# Patient Record
Sex: FEMALE | Race: BLACK OR AFRICAN AMERICAN
[De-identification: names, ages, dates, MRNs, and addresses within clinical notes are randomized per-mention and may not be internally consistent; named-entity substitution may affect disease eponyms.]

---

## 2017-05-03 ENCOUNTER — HOSPITAL ENCOUNTER (EMERGENCY)
Age: 22
Discharge: HOME | End: 2017-05-03
Payer: COMMERCIAL
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
## 2017-05-03 DIAGNOSIS — Z79.4: ICD-10-CM
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
## 2017-05-03 DIAGNOSIS — T23.251A: Primary | ICD-10-CM

## 2017-05-03 DIAGNOSIS — I10: ICD-10-CM

## 2017-05-03 DIAGNOSIS — E11.9: ICD-10-CM

## 2017-05-03 PROCEDURE — 1040M: CPT

## 2017-05-03 PROCEDURE — 99283 EMERGENCY DEPT VISIT LOW MDM: CPT

## 2017-05-03 PROCEDURE — 99282 EMERGENCY DEPT VISIT SF MDM: CPT

## 2017-06-02 ENCOUNTER — HOSPITAL ENCOUNTER (OUTPATIENT)
Dept: HOSPITAL 76 - EMS | Age: 22
Discharge: ANOTHER HOSPITAL | End: 2017-06-02
Attending: SURGERY
Payer: MEDICAID

## 2017-06-02 DIAGNOSIS — R73.09: Primary | ICD-10-CM

## 2017-06-02 DIAGNOSIS — R11.2: ICD-10-CM

## 2019-09-27 ENCOUNTER — HOSPITAL ENCOUNTER (OUTPATIENT)
Dept: HOSPITAL 76 - EMS | Age: 24
End: 2019-09-27
Attending: SURGERY
Payer: MEDICAID

## 2019-09-27 DIAGNOSIS — R73.09: ICD-10-CM

## 2019-09-27 DIAGNOSIS — R56.9: Primary | ICD-10-CM

## 2019-11-03 ENCOUNTER — HOSPITAL ENCOUNTER (EMERGENCY)
Dept: HOSPITAL 76 - ED | Age: 24
Discharge: HOME | End: 2019-11-03
Payer: MEDICAID

## 2019-11-03 ENCOUNTER — HOSPITAL ENCOUNTER (OUTPATIENT)
Dept: HOSPITAL 76 - EMS | Age: 24
Discharge: ANOTHER HOSPITAL | End: 2019-11-03
Attending: SURGERY
Payer: MEDICAID

## 2019-11-03 VITALS — DIASTOLIC BLOOD PRESSURE: 99 MMHG | SYSTOLIC BLOOD PRESSURE: 147 MMHG

## 2019-11-03 DIAGNOSIS — R11.2: Primary | ICD-10-CM

## 2019-11-03 DIAGNOSIS — R53.1: ICD-10-CM

## 2019-11-03 DIAGNOSIS — E86.0: Primary | ICD-10-CM

## 2019-11-03 DIAGNOSIS — I10: ICD-10-CM

## 2019-11-03 DIAGNOSIS — K31.84: ICD-10-CM

## 2019-11-03 DIAGNOSIS — E10.43: ICD-10-CM

## 2019-11-03 DIAGNOSIS — E10.65: ICD-10-CM

## 2019-11-03 LAB
ALBUMIN DIAFP-MCNC: 3.9 G/DL (ref 3.2–5.5)
ALBUMIN/GLOB SERPL: 1.1 {RATIO} (ref 1–2.2)
ALP SERPL-CCNC: 36 IU/L (ref 42–121)
ALT SERPL W P-5'-P-CCNC: 10 IU/L (ref 10–60)
ANION GAP SERPL CALCULATED.4IONS-SCNC: 13 MMOL/L (ref 6–13)
AST SERPL W P-5'-P-CCNC: 15 IU/L (ref 10–42)
BASE EXCESS BLDV CALC-SCNC: -0.2 MMOL/L
BASOPHILS NFR BLD AUTO: 0 10^3/UL (ref 0–0.1)
BASOPHILS NFR BLD AUTO: 0.2 %
BILIRUB BLD-MCNC: 1.5 MG/DL (ref 0.2–1)
BILIRUB UR QL CFM: NEGATIVE
BUN SERPL-MCNC: 8 MG/DL (ref 6–20)
CALCIUM UR-MCNC: 8.9 MG/DL (ref 8.5–10.3)
CHLORIDE SERPL-SCNC: 108 MMOL/L (ref 101–111)
CLARITY UR REFRACT.AUTO: CLEAR
CO2 BLDV-SCNC: 25.6 MMOL/L (ref 24–29)
CO2 SERPL-SCNC: 23 MMOL/L (ref 21–32)
CREAT SERPLBLD-SCNC: 0.8 MG/DL (ref 0.4–1)
EOSINOPHIL # BLD AUTO: 0 10^3/UL (ref 0–0.7)
EOSINOPHIL NFR BLD AUTO: 0 %
ERYTHROCYTE [DISTWIDTH] IN BLOOD BY AUTOMATED COUNT: 14.2 % (ref 12–15)
GFRSERPLBLD MDRD-ARVRAT: 109 ML/MIN/{1.73_M2} (ref 89–?)
GLOBULIN SER-MCNC: 3.5 G/DL (ref 2.1–4.2)
GLUCOSE SERPL-MCNC: 135 MG/DL (ref 70–100)
GLUCOSE UR QL STRIP.AUTO: NEGATIVE MG/DL
HCG UR QL: NEGATIVE
HGB UR QL STRIP: 11.1 G/DL (ref 12–16)
KETONES SERPL STRIP-SCNC: NEGATIVE MMOL/L
KETONES UR QL STRIP.AUTO: >=80 MG/DL
LIPASE SERPL-CCNC: 21 U/L (ref 22–51)
LYMPHOCYTES # SPEC AUTO: 0.7 10^3/UL (ref 1.5–3.5)
LYMPHOCYTES NFR BLD AUTO: 7.5 %
MCH RBC QN AUTO: 24.7 PG (ref 27–31)
MCHC RBC AUTO-ENTMCNC: 32.2 G/DL (ref 32–36)
MCV RBC AUTO: 76.7 FL (ref 81–99)
MONOCYTES # BLD AUTO: 0.2 10^3/UL (ref 0–1)
MONOCYTES NFR BLD AUTO: 1.8 %
MUCOUS THREADS URNS QL MICRO: (no result)
NEUTROPHILS # BLD AUTO: 8.1 10^3/UL (ref 1.5–6.6)
NEUTROPHILS # SNV AUTO: 9 X10^3/UL (ref 4.8–10.8)
NEUTROPHILS NFR BLD AUTO: 90.1 %
NITRITE UR QL STRIP.AUTO: NEGATIVE
PCO2 BLDV: 39.5 MMHG (ref 41–51)
PDW BLD AUTO: 10.9 FL (ref 7.9–10.8)
PH BLDV: 7.41 [PH] (ref 7.31–7.41)
PH UR STRIP.AUTO: 6 PH (ref 5–7.5)
PLATELET # BLD: 265 10^3/UL (ref 130–450)
PO2 BLDV: 34.8 MMHG (ref 25–47)
PROT SPEC-MCNC: 7.4 G/DL (ref 6.7–8.2)
PROT UR STRIP.AUTO-MCNC: 30 MG/DL
RBC # UR STRIP.AUTO: NEGATIVE /UL
RBC # URNS HPF: (no result) /HPF (ref 0–5)
RBC MAR: 4.5 10^6/UL (ref 4.2–5.4)
SODIUM SERPLBLD-SCNC: 144 MMOL/L (ref 135–145)
SP GR UR STRIP.AUTO: 1.02 (ref 1–1.03)
SQUAMOUS URNS QL MICRO: (no result)
UROBILINOGEN UR QL STRIP.AUTO: (no result) E.U./DL
UROBILINOGEN UR STRIP.AUTO-MCNC: NEGATIVE MG/DL

## 2019-11-03 PROCEDURE — 36415 COLL VENOUS BLD VENIPUNCTURE: CPT

## 2019-11-03 PROCEDURE — 82803 BLOOD GASES ANY COMBINATION: CPT

## 2019-11-03 PROCEDURE — 82009 KETONE BODYS QUAL: CPT

## 2019-11-03 PROCEDURE — 83605 ASSAY OF LACTIC ACID: CPT

## 2019-11-03 PROCEDURE — 96375 TX/PRO/DX INJ NEW DRUG ADDON: CPT

## 2019-11-03 PROCEDURE — 96374 THER/PROPH/DIAG INJ IV PUSH: CPT

## 2019-11-03 PROCEDURE — 99284 EMERGENCY DEPT VISIT MOD MDM: CPT

## 2019-11-03 PROCEDURE — 83690 ASSAY OF LIPASE: CPT

## 2019-11-03 PROCEDURE — 87086 URINE CULTURE/COLONY COUNT: CPT

## 2019-11-03 PROCEDURE — 81025 URINE PREGNANCY TEST: CPT

## 2019-11-03 PROCEDURE — 81003 URINALYSIS AUTO W/O SCOPE: CPT

## 2019-11-03 PROCEDURE — 81001 URINALYSIS AUTO W/SCOPE: CPT

## 2019-11-03 PROCEDURE — 80053 COMPREHEN METABOLIC PANEL: CPT

## 2019-11-03 PROCEDURE — 85025 COMPLETE CBC W/AUTO DIFF WBC: CPT

## 2019-11-03 NOTE — ED PHYSICIAN DOCUMENTATION
PD HPI NVD





- Stated complaint


Stated Complaint: N/V





- Chief complaint


Chief Complaint: Abd Pain





- History obtained from


History obtained from: Patient





- History of Present Illness


Timing - onset: How many days ago (3)


Timing - duration: Days (3)


Timing - details: Gradual onset, Still present


Associated symptoms: Abdominal pain, Dizzy, Near syncope / syncope, Loss of 

appetite


Contributing factors: Diabetes


Improved by: Vomiting


Similar symptoms before: Diagnosis (dehydration)


Recently seen: Emergency Dept





- Additonal information


Additional information: 





26-year-old female with a history of type 1 diabetes has developed nausea and 

vomiting over the past 3 days.  She has developed abdominal pain associated with

this and she continues to have issues with vomiting.  She was seen in the 

emergency department at [facility] this morning at 5 AM.  She states that 

there she received several 100 mL's of fluid and Zofran did not seem to help 

with the vomiting Reglan did seem to help and she was sent home with a 

prescription for some Reglan.  She does not feel that her diabetes is out of 

control.





Review of Systems


Constitutional: reports: Chills.  denies: Fever


Eyes: denies: Decreased vision


Ears: denies: Ear pain


Nose: denies: Rhinorrhea / runny nose, Congestion


Throat: denies: Sore throat


Cardiac: denies: Chest pain / pressure, Palpitations


Respiratory: denies: Dyspnea, Cough


GI: reports: Abdominal Pain, Nausea, Vomiting


: denies: Dysuria, Frequency





PD PAST MEDICAL HISTORY





- Past Medical History


Cardiovascular: Hypertension


Endocrine/Autoimmune: Type 1 diabetes





- Past Surgical History


Past Surgical History: No





- Present Medications


Home Medications: 


                                Ambulatory Orders











 Medication  Instructions  Recorded  Confirmed


 


Ferrous Sulfate [Iron] 1 tab ORAL DAILY 12/17/13 05/03/17


 


Lisinopril [Prinivil] 0 mg ORAL DAILY 12/17/13 05/03/17


 


Chlorthalidone 0 mg ORAL DAILY 05/03/17 05/03/17


 


Insulin Glargine [Lantus] 32 units SQ DAILY 05/03/17 05/03/17


 


Insulin Lispro [Humalog] 0 units SQ TIDWM 05/03/17 05/03/17














- Allergies


Allergies/Adverse Reactions: 


                                    Allergies











Allergy/AdvReac Type Severity Reaction Status Date / Time


 


hydrocodone bitartrate * Allergy  Itching Verified 05/03/17 10:13





[From Vicodin]     


 


ibuprofen AdvReac  Unknown Verified 05/03/17 10:39














- Social History


Does the pt smoke?: No


Smoking Status: Never smoker


Does the pt drink ETOH?: No


Does the pt have substance abuse?: No





- Immunizations


Immunizations are current?: Yes





- POLST


Patient has POLST: No





PD ED PE NORMAL





- Vitals


Vital signs reviewed: Yes (tachy and hypertensive)





- General


General: Alert and oriented X 3, No acute distress, Well developed/nourished





- HEENT


HEENT: Atraumatic, PERRL, EOMI





- Neck


Neck: Supple, no meningeal sign, No bony TTP





- Cardiac


Cardiac: No murmur, Other (tachy to 110)





- Respiratory


Respiratory: No respiratory distress, Clear bilaterally





- Abdomen


Abdomen: Soft, Other (mild epigastric tenderness)





- Back


Back: No CVA TTP, No spinal TTP





- Derm


Derm: Normal color, Warm and dry, No rash





- Extremities


Extremities: No deformity, No edema





- Neuro


Neuro: Alert and oriented X 3, CNs 2-12 intact, No motor deficit, No sensory 

deficit, Normal speech


Eye Opening: Spontaneous


Motor: Obeys Commands


Verbal: Oriented


GCS Score: 15





- Psych


Psych: Normal mood, Normal affect





Results





- Vitals


Vitals: 


                               Vital Signs - 24 hr











  11/03/19 11/03/19





  10:29 12:07


 


Temperature 37.4 C 


 


Heart Rate 119 H 88


 


Respiratory 18 18





Rate  


 


Blood Pressure 173/112 H 147/99 H


 


O2 Saturation 97 98








                                     Oxygen











O2 Source                      Room air

















- Labs


Labs: 


                                Laboratory Tests











  11/03/19 11/03/19 11/03/19





  11:21 11:21 11:21


 


WBC  9.0  


 


RBC  4.50  


 


Hgb  11.1 L  


 


Hct  34.5 L  


 


MCV  76.7 L  


 


MCH  24.7 L  


 


MCHC  32.2  


 


RDW  14.2  


 


Plt Count  265  


 


MPV  10.9 H  


 


Neut # (Auto)  8.1 H  


 


Lymph # (Auto)  0.7 L  


 


Mono # (Auto)  0.2  


 


Eos # (Auto)  0.0  


 


Baso # (Auto)  0.0  


 


Absolute Nucleated RBC  0.00  


 


Nucleated RBC %  0.0  


 


VBG pH   


 


VBG pCO2   


 


VBG pO2   


 


VBG HCO3   


 


VBG Total CO2   


 


VBG O2 Saturation   


 


VBG Base Excess   


 


Sodium   144 


 


Potassium   3.0 L 


 


Chloride   108 


 


Carbon Dioxide   23 


 


Anion Gap   13.0 


 


BUN   8 


 


Creatinine   0.8 


 


Estimated GFR (MDRD)   109 


 


Glucose   135 H 


 


Lactic Acid    1.7


 


Calcium   8.9 


 


Total Bilirubin   1.5 H 


 


AST   15 


 


ALT   10 


 


Alkaline Phosphatase   36 L 


 


Total Protein   7.4 


 


Albumin   3.9 


 


Globulin   3.5 


 


Albumin/Globulin Ratio   1.1 


 


Lipase   21 L 


 


Urine Color   


 


Urine Clarity   


 


Urine pH   


 


Ur Specific Gravity   


 


Urine Protein   


 


Urine Glucose (UA)   


 


Urine Ketones   


 


Urine Occult Blood   


 


Urine Nitrite   


 


Urine Bilirubin   


 


Urine Urobilinogen   


 


Ur Leukocyte Esterase   


 


Ur Microscopic Review   


 


Urine Culture Comments   


 


Urine HCG, Qual   


 


Serum Ketones   NEGATIVE 














  11/03/19 11/03/19





  11:21 12:15


 


WBC  


 


RBC  


 


Hgb  


 


Hct  


 


MCV  


 


MCH  


 


MCHC  


 


RDW  


 


Plt Count  


 


MPV  


 


Neut # (Auto)  


 


Lymph # (Auto)  


 


Mono # (Auto)  


 


Eos # (Auto)  


 


Baso # (Auto)  


 


Absolute Nucleated RBC  


 


Nucleated RBC %  


 


VBG pH  7.408 


 


VBG pCO2  39.5 L 


 


VBG pO2  34.8 


 


VBG HCO3  24.4 


 


VBG Total CO2  25.6 


 


VBG O2 Saturation  70.4 


 


VBG Base Excess  -0.2 


 


Sodium  


 


Potassium  


 


Chloride  


 


Carbon Dioxide  


 


Anion Gap  


 


BUN  


 


Creatinine  


 


Estimated GFR (MDRD)  


 


Glucose  


 


Lactic Acid  


 


Calcium  


 


Total Bilirubin  


 


AST  


 


ALT  


 


Alkaline Phosphatase  


 


Total Protein  


 


Albumin  


 


Globulin  


 


Albumin/Globulin Ratio  


 


Lipase  


 


Urine Color   YELLOW


 


Urine Clarity   CLEAR


 


Urine pH   6.0


 


Ur Specific Gravity   1.020


 


Urine Protein   30 H


 


Urine Glucose (UA)   NEGATIVE


 


Urine Ketones   >=80 H


 


Urine Occult Blood   NEGATIVE


 


Urine Nitrite   NEGATIVE


 


Urine Bilirubin   NEGATIVE


 


Urine Urobilinogen   0.2 (NORMAL)


 


Ur Leukocyte Esterase   NEGATIVE


 


Ur Microscopic Review   INDICATED


 


Urine Culture Comments   Not Reportable


 


Urine HCG, Qual   NEGATIVE


 


Serum Ketones  














Procedures





- IVC sono (time)


  ** 1040


Bedside IVC sono: IVC measures (cm) (0.87), IVC collapsed c insp (cm) 

(complete), Dehydration (est 2 liter deficit)





PD MEDICAL DECISION MAKING





- ED course


Complexity details: reviewed old records, reviewed results, re-evaluated 

patient, considered differential, d/w patient


ED course: 





24-year-old type I diabetic with nausea and vomiting is dehydrated on 

interrogation the inferior vena cava and she is administered intravenous saline 

and another dose of Reglan.  She does have some improvement with this she has 

some abdominal pain and received some Dilaudid as well.  She feels much improved

at the conclusion of treatment.  She does not have ketoacidosis.  I did discuss 

with the patient use of cannabis and the possibility of cannabis hyperemesis and

the patient indicates that she has decreased the amount of cannabis she has been

using over the past several weeks and she has not used in the past 2 days.  She 

does state that she was on a new medication prescribed for depression and she 

was on day 3 of this when her vomiting started and she has not been on this 

since and has had 2 more days of vomiting.  She does not think the medication 

had anything to do with this.





The patient was prescribed Reglan and Zofran today at [facility] and I have

encouraged her to use those prescriptions for her treatment.





Departure





- Departure


Disposition: 01 Home, Self Care


Clinical Impression: 


 Dehydration, Gastroparesis diabeticorum





Diabetes mellitus with hyperglycemia


Qualifiers:


 Diabetes mellitus type: type 1 Qualified Code(s): E10.65 - Type 1 diabetes 

mellitus with hyperglycemia





Condition: Stable


Instructions:  ED Dehydration, ED Diabetic Gastroparesis


Follow-Up: 


Your, doctor [Other]


Comments: 


Today it appears likely that you have diabetic gastroparesis and this will cause

 her stomach to operate in reverse fashion.  The metoclopramide is the best 

medication for helping when this happens and we recommend you take another dose 

of this today.  If you have intractable vomiting return to the emergency 

department.

## 2020-02-12 ENCOUNTER — HOSPITAL ENCOUNTER (OUTPATIENT)
Dept: HOSPITAL 76 - EMS | Age: 25
Discharge: ANOTHER HOSPITAL | End: 2020-02-12
Attending: SURGERY
Payer: MEDICAID

## 2020-02-12 ENCOUNTER — HOSPITAL ENCOUNTER (EMERGENCY)
Dept: HOSPITAL 76 - ED | Age: 25
Discharge: HOME | End: 2020-02-12
Payer: MEDICAID

## 2020-02-12 VITALS — SYSTOLIC BLOOD PRESSURE: 144 MMHG | DIASTOLIC BLOOD PRESSURE: 83 MMHG

## 2020-02-12 DIAGNOSIS — R10.9: Primary | ICD-10-CM

## 2020-02-12 DIAGNOSIS — E11.65: Primary | ICD-10-CM

## 2020-02-12 DIAGNOSIS — I10: ICD-10-CM

## 2020-02-12 DIAGNOSIS — R00.0: ICD-10-CM

## 2020-02-12 DIAGNOSIS — Z79.4: ICD-10-CM

## 2020-02-12 DIAGNOSIS — R11.2: ICD-10-CM

## 2020-02-12 DIAGNOSIS — R73.09: ICD-10-CM

## 2020-02-12 LAB
ALBUMIN DIAFP-MCNC: 4.2 G/DL (ref 3.2–5.5)
ALBUMIN/GLOB SERPL: 1.1 {RATIO} (ref 1–2.2)
ALP SERPL-CCNC: 40 IU/L (ref 42–121)
ALT SERPL W P-5'-P-CCNC: 13 IU/L (ref 10–60)
ANION GAP SERPL CALCULATED.4IONS-SCNC: 17 MMOL/L (ref 6–13)
APTT PPP: 25.7 SECS (ref 24.9–33.3)
AST SERPL W P-5'-P-CCNC: 22 IU/L (ref 10–42)
BASE EXCESS BLDV CALC-SCNC: -3 MMOL/L
BASOPHILS NFR BLD AUTO: 0 10^3/UL (ref 0–0.1)
BASOPHILS NFR BLD AUTO: 0.3 %
BILIRUB BLD-MCNC: 1.9 MG/DL (ref 0.2–1)
BUN SERPL-MCNC: 13 MG/DL (ref 6–20)
CALCIUM UR-MCNC: 9.5 MG/DL (ref 8.5–10.3)
CHLORIDE SERPL-SCNC: 102 MMOL/L (ref 101–111)
CK SERPL-CCNC: 49 IU/L (ref 22–269)
CO2 BLDV-SCNC: 20.1 MMOL/L (ref 24–29)
CO2 SERPL-SCNC: 19 MMOL/L (ref 21–32)
CREAT SERPLBLD-SCNC: 0.7 MG/DL (ref 0.4–1)
EOSINOPHIL # BLD AUTO: 0 10^3/UL (ref 0–0.7)
EOSINOPHIL NFR BLD AUTO: 0 %
ERYTHROCYTE [DISTWIDTH] IN BLOOD BY AUTOMATED COUNT: 14.6 % (ref 12–15)
GFRSERPLBLD MDRD-ARVRAT: 127 ML/MIN/{1.73_M2} (ref 89–?)
GLOBULIN SER-MCNC: 3.9 G/DL (ref 2.1–4.2)
GLUCOSE SERPL-MCNC: 241 MG/DL (ref 70–100)
HGB UR QL STRIP: 12.3 G/DL (ref 12–16)
INR PPP: 1.1 (ref 0.8–1.2)
KETONES SERPL STRIP-SCNC: NEGATIVE MMOL/L
LIPASE SERPL-CCNC: 22 U/L (ref 22–51)
LYMPHOCYTES # SPEC AUTO: 1 10^3/UL (ref 1.5–3.5)
LYMPHOCYTES NFR BLD AUTO: 7.5 %
MAGNESIUM SERPL-MCNC: 1.7 MG/DL (ref 1.7–2.8)
MCH RBC QN AUTO: 23.7 PG (ref 27–31)
MCHC RBC AUTO-ENTMCNC: 31.9 G/DL (ref 32–36)
MCV RBC AUTO: 74.2 FL (ref 81–99)
MONOCYTES # BLD AUTO: 0.4 10^3/UL (ref 0–1)
MONOCYTES NFR BLD AUTO: 2.7 %
NEUTROPHILS # BLD AUTO: 11.7 10^3/UL (ref 1.5–6.6)
NEUTROPHILS # SNV AUTO: 13.1 X10^3/UL (ref 4.8–10.8)
NEUTROPHILS NFR BLD AUTO: 89 %
PCO2 BLDV: 27.1 MMHG (ref 41–51)
PDW BLD AUTO: 11.3 FL (ref 7.9–10.8)
PH BLDV: 7.47 [PH] (ref 7.31–7.41)
PHOSPHATE BLD-MCNC: 2.6 MG/DL (ref 2.5–4.6)
PLATELET # BLD: 267 10^3/UL (ref 130–450)
PO2 BLDV: 60.2 MMHG (ref 25–47)
PROT SPEC-MCNC: 8.1 G/DL (ref 6.7–8.2)
PROTHROM ACT/NOR PPP: 12.9 SECS (ref 9.9–12.6)
RBC MAR: 5.2 10^6/UL (ref 4.2–5.4)
SODIUM SERPLBLD-SCNC: 138 MMOL/L (ref 135–145)

## 2020-02-12 PROCEDURE — 80320 DRUG SCREEN QUANTALCOHOLS: CPT

## 2020-02-12 PROCEDURE — 93005 ELECTROCARDIOGRAM TRACING: CPT

## 2020-02-12 PROCEDURE — 85025 COMPLETE CBC W/AUTO DIFF WBC: CPT

## 2020-02-12 PROCEDURE — 85610 PROTHROMBIN TIME: CPT

## 2020-02-12 PROCEDURE — 82550 ASSAY OF CK (CPK): CPT

## 2020-02-12 PROCEDURE — 83735 ASSAY OF MAGNESIUM: CPT

## 2020-02-12 PROCEDURE — 36415 COLL VENOUS BLD VENIPUNCTURE: CPT

## 2020-02-12 PROCEDURE — 99283 EMERGENCY DEPT VISIT LOW MDM: CPT

## 2020-02-12 PROCEDURE — 99284 EMERGENCY DEPT VISIT MOD MDM: CPT

## 2020-02-12 PROCEDURE — 82009 KETONE BODYS QUAL: CPT

## 2020-02-12 PROCEDURE — 80053 COMPREHEN METABOLIC PANEL: CPT

## 2020-02-12 PROCEDURE — 96365 THER/PROPH/DIAG IV INF INIT: CPT

## 2020-02-12 PROCEDURE — 84484 ASSAY OF TROPONIN QUANT: CPT

## 2020-02-12 PROCEDURE — 83605 ASSAY OF LACTIC ACID: CPT

## 2020-02-12 PROCEDURE — 96375 TX/PRO/DX INJ NEW DRUG ADDON: CPT

## 2020-02-12 PROCEDURE — 82803 BLOOD GASES ANY COMBINATION: CPT

## 2020-02-12 PROCEDURE — 85730 THROMBOPLASTIN TIME PARTIAL: CPT

## 2020-02-12 PROCEDURE — 83690 ASSAY OF LIPASE: CPT

## 2020-02-12 PROCEDURE — 84100 ASSAY OF PHOSPHORUS: CPT

## 2020-02-12 NOTE — ED PHYSICIAN DOCUMENTATION
History of Present Illness





- Stated complaint


Stated Complaint: N/V, ABD PAIN





- History obtained from


History obtained from: Patient (Patient is a 24-year-old female who arrives via 

ambulance with a chief complaint of nausea and vomiting.  She reports she has an

insulin-dependent diabetic who also smokes marijuana daily but has been vomiting

for the last 2 days and is unable to keep any of her medications down.  She 

denies any fevers headache neck pain or severe abdominal pain.)





Review of Systems


Constitutional: reports: Reviewed and negative


Eyes: reports: Reviewed and negative


Ears: reports: Reviewed and negative


Nose: reports: Reviewed and negative


Throat: reports: Reviewed and negative


Cardiac: reports: Reviewed and negative


Respiratory: reports: Reviewed and negative


GI: reports: Nausea, Vomiting, Reviewed and negative


: reports: Reviewed and negative


Skin: reports: Reviewed and negative


Musculoskeletal: reports: Reviewed and negative


Neurologic: reports: Reviewed and negative


Psychiatric: reports: Reviewed and negative


Endocrine: reports: Reviewed and negative


Immunocompromised: reports: Reviewed and negative





PD PAST MEDICAL HISTORY





- Present Medications


Home Medications: 


                                Ambulatory Orders











 Medication  Instructions  Recorded  Confirmed


 


Lisinopril [Prinivil] 0 mg ORAL DAILY 12/17/13 02/12/20


 


Chlorthalidone 0 mg ORAL DAILY 05/03/17 02/12/20


 


Insulin Glargine [Lantus] 32 units SQ DAILY 05/03/17 02/12/20


 


Insulin Lispro [Humalog] 0 units SQ TIDWM 05/03/17 02/12/20














- Allergies


Allergies/Adverse Reactions: 


                                    Allergies











Allergy/AdvReac Type Severity Reaction Status Date / Time


 


hydrocodone bitartrate * Allergy  Itching Verified 02/12/20 21:10





[From Vicodin]     


 


ibuprofen AdvReac  Unknown Verified 02/12/20 21:10














PD ED PE NORMAL





- Vitals


Vital signs reviewed: Yes





- General


General: Alert and oriented X 3, No acute distress, Well developed/nourished 

(actively dry heaving into emesis basin, non toxic and non septic appearing. )





- HEENT


HEENT: Atraumatic, PERRL, EOMI, Ears normal, Moist mucous membranes, Pharynx 

benign





- Neck


Neck: Supple, no meningeal sign, No adenopathy, No JVD





- Cardiac


Cardiac: RRR, No murmur, Strong equal pulses





- Respiratory


Respiratory: No respiratory distress, Clear bilaterally





- Abdomen


Abdomen: Normal bowel sounds, Soft, Non tender, Non distended, No organomegaly





- Back


Back: No CVA TTP, No spinal TTP





- Derm


Derm: Normal color, Warm and dry, No rash





- Extremities


Extremities: No deformity, No tenderness to palpate, No edema





- Neuro


Neuro: Alert and oriented X 3, CNs 2-12 intact, No motor deficit, No sensory 

deficit, Normal speech





- Psych


Psych: Normal mood, Normal affect





Results





- Vitals


Vitals: 


                               Vital Signs - 24 hr











  02/12/20 02/12/20





  21:07 23:25


 


Temperature 37.0 C 


 


Heart Rate 137 H 115 H


 


Respiratory 22 16





Rate  


 


Blood Pressure 214/132 H 146/96 H


 


O2 Saturation 100 100








                                     Oxygen











O2 Source                      Room air

















- EKG (time done)


  ** 21:34


Rate: Rate (enter#) (111), Tachy


Rhythm: Sinus tachycardia


Axis: Other (borderline RAD)


Intervals: Normal IL, Other (borderline prolonged QT)


QRS: Normal


Ischemia: Normal ST segments





- Labs


Labs: 


                                Laboratory Tests











  02/12/20 02/12/20 02/12/20





  21:40 21:40 21:40


 


WBC  13.1 H  


 


RBC  5.20  


 


Hgb  12.3  


 


Hct  38.6  


 


MCV  74.2 L  


 


MCH  23.7 L  


 


MCHC  31.9 L  


 


RDW  14.6  


 


Plt Count  267  


 


MPV  11.3 H  


 


Neut # (Auto)  11.7 H  


 


Lymph # (Auto)  1.0 L  


 


Mono # (Auto)  0.4  


 


Eos # (Auto)  0.0  


 


Baso # (Auto)  0.0  


 


Absolute Nucleated RBC  0.00  


 


Nucleated RBC %  0.0  


 


PT   12.9 H 


 


INR   1.1 


 


APTT   25.7 


 


VBG pH   


 


VBG pCO2   


 


VBG pO2   


 


VBG HCO3   


 


VBG Total CO2   


 


VBG O2 Saturation   


 


VBG Base Excess   


 


Sodium    138


 


Potassium    3.6


 


Chloride    102


 


Carbon Dioxide    19 L


 


Anion Gap    17.0 H


 


BUN    13


 


Creatinine    0.7


 


Estimated GFR (MDRD)    127


 


Glucose    241 H


 


Lactic Acid   


 


Calcium    9.5


 


Phosphorus    2.6


 


Magnesium    1.7


 


Total Bilirubin    1.9 H


 


AST    22


 


ALT    13


 


Alkaline Phosphatase    40 L


 


Total Creatine Kinase    49


 


Troponin I High Sens   


 


Total Protein    8.1


 


Albumin    4.2


 


Globulin    3.9


 


Albumin/Globulin Ratio    1.1


 


Lipase    22


 


Ethyl Alcohol    < 5.0


 


Serum Ketones    NEGATIVE














  02/12/20 02/12/20 02/12/20





  21:40 21:40 21:40


 


WBC   


 


RBC   


 


Hgb   


 


Hct   


 


MCV   


 


MCH   


 


MCHC   


 


RDW   


 


Plt Count   


 


MPV   


 


Neut # (Auto)   


 


Lymph # (Auto)   


 


Mono # (Auto)   


 


Eos # (Auto)   


 


Baso # (Auto)   


 


Absolute Nucleated RBC   


 


Nucleated RBC %   


 


PT   


 


INR   


 


APTT   


 


VBG pH    7.469 H


 


VBG pCO2    27.1 L


 


VBG pO2    60.2 H


 


VBG HCO3    19.2 L


 


VBG Total CO2    20.1 L


 


VBG O2 Saturation    93.7 H


 


VBG Base Excess    -3.0 L


 


Sodium   


 


Potassium   


 


Chloride   


 


Carbon Dioxide   


 


Anion Gap   


 


BUN   


 


Creatinine   


 


Estimated GFR (MDRD)   


 


Glucose   


 


Lactic Acid  2.5 H  


 


Calcium   


 


Phosphorus   


 


Magnesium   


 


Total Bilirubin   


 


AST   


 


ALT   


 


Alkaline Phosphatase   


 


Total Creatine Kinase   


 


Troponin I High Sens   3.5 


 


Total Protein   


 


Albumin   


 


Globulin   


 


Albumin/Globulin Ratio   


 


Lipase   


 


Ethyl Alcohol   


 


Serum Ketones   














PD MEDICAL DECISION MAKING





- ED course


Complexity details: re-evaluated patient (23:11 sx resolved, tolerated po 

challenge want to be dcd home.), other (r/o dka, hydrate, anetiemetics and 

reval.)





Departure





- Departure


Disposition: 01 Home, Self Care


Clinical Impression: 


 Hyperglycemia, IDDM (insulin dependent diabetes mellitus)





Vomiting


Qualifiers:


 Vomiting type: unspecified Vomiting Intractability: unspecified Nausea 

presence: unspecified Qualified Code(s): R11.10 - Vomiting, unspecified





Hypertension


Qualifiers:


 Hypertension type: unspecified Qualified Code(s): I10 - Essential (primary) 

hypertension





Condition: Good


Instructions:  Nausea Vomit Control


Follow-Up: 


YOUR,DOCTOR [Other] - Tomorrow

## 2020-03-29 ENCOUNTER — HOSPITAL ENCOUNTER (OUTPATIENT)
Dept: HOSPITAL 76 - EMS | Age: 25
End: 2020-03-29
Attending: SURGERY
Payer: MEDICAID

## 2020-03-29 DIAGNOSIS — R73.09: ICD-10-CM

## 2020-03-29 DIAGNOSIS — R56.9: Primary | ICD-10-CM

## 2020-04-10 ENCOUNTER — HOSPITAL ENCOUNTER (OUTPATIENT)
Dept: HOSPITAL 76 - EMS | Age: 25
Discharge: ANOTHER HOSPITAL | End: 2020-04-10
Attending: SURGERY
Payer: MEDICAID

## 2020-04-10 ENCOUNTER — HOSPITAL ENCOUNTER (OUTPATIENT)
Dept: HOSPITAL 76 - EMS | Age: 25
End: 2020-04-10
Attending: SURGERY
Payer: MEDICAID

## 2020-04-10 DIAGNOSIS — R11.2: Primary | ICD-10-CM

## 2020-05-04 ENCOUNTER — HOSPITAL ENCOUNTER (OUTPATIENT)
Dept: HOSPITAL 76 - EMS | Age: 25
End: 2020-05-04
Attending: SURGERY
Payer: MEDICAID

## 2020-05-04 DIAGNOSIS — R73.09: Primary | ICD-10-CM
